# Patient Record
Sex: MALE | Race: BLACK OR AFRICAN AMERICAN | NOT HISPANIC OR LATINO | URBAN - METROPOLITAN AREA
[De-identification: names, ages, dates, MRNs, and addresses within clinical notes are randomized per-mention and may not be internally consistent; named-entity substitution may affect disease eponyms.]

---

## 2018-12-11 ENCOUNTER — EMERGENCY (EMERGENCY)
Facility: HOSPITAL | Age: 43
LOS: 0 days | Discharge: HOME | End: 2018-12-11
Attending: EMERGENCY MEDICINE
Payer: SELF-PAY

## 2018-12-11 VITALS
TEMPERATURE: 98 F | DIASTOLIC BLOOD PRESSURE: 98 MMHG | RESPIRATION RATE: 18 BRPM | WEIGHT: 225.09 LBS | SYSTOLIC BLOOD PRESSURE: 162 MMHG | HEIGHT: 70 IN | OXYGEN SATURATION: 95 % | HEART RATE: 98 BPM

## 2018-12-11 PROCEDURE — 99283 EMERGENCY DEPT VISIT LOW MDM: CPT

## 2018-12-11 RX ORDER — OXYCODONE AND ACETAMINOPHEN 5; 325 MG/1; MG/1
1 TABLET ORAL ONCE
Qty: 0 | Refills: 0 | Status: DISCONTINUED | OUTPATIENT
Start: 2018-12-11 | End: 2018-12-11

## 2018-12-11 RX ADMIN — Medication 1 TABLET(S): at 20:54

## 2018-12-11 RX ADMIN — OXYCODONE AND ACETAMINOPHEN 1 TABLET(S): 5; 325 TABLET ORAL at 20:53

## 2018-12-11 NOTE — ED PROVIDER NOTE - OBJECTIVE STATEMENT
44 y/o male with no PMH here c/o R upper dental pain/fracture s/p hit in mouth with basketball last night. pt states two pieces of the tooth came out which he has at home in a tissue. pt states he went to two dental clinics today who couldn't see him and was told to go to the [facility] dental clinic, but came here. pt states he had dental work done few years ago and had tooth extractions next to the one that broke. pt states he took motrin/aleve with mild relief. pt denies LOC, pt otherwise has no other complaints. denies dizziness, change in vision    ROS: No fever/chills. No eye pain/changes in vision, No ear pain/sore throat/dysphagia, No chest pain/palpitations. No SOB/cough/. No abdominal pain, N/V/D, no black/bloody bm. No dysuria/frequency/discharge, No headache. No Dizziness.    No rashes or breaks in skin. No numbness/tingling/weakness.

## 2018-12-11 NOTE — ED PROVIDER NOTE - ATTENDING CONTRIBUTION TO CARE
Dimitri: I performed a face to face bedside interview with patient regarding history of present illness, review of symptoms and past medical history. I completed an independent physical exam.  I have discussed patient's plan of care with advanced care provider.   I agree with note as stated above including HISTORY OF PRESENT ILLNESS, HIV, PAST MEDICAL/SURGICAL/FAMILY/SOCIAL HISTORY, ALLERGIES AND HOME MEDICATIONS, REVIEW OF SYSTEMS, PHYSICAL EXAM, MEDICAL DECISION MAKING and any PROGRESS NOTES during the time I functioned as the attending physician for this patient  unless otherwise noted. My brief assessment is as follows: injury to right 3rd maxillary molar (#3) last night playing basketball after being hit in mouth with bball. Two separate pieces of tooth fell out. Went to multiple dental clinics today but unable to be seen, meant to go to [facility] for dental clinic but came to . No airway issues, no sign abscess, no fever, no neuro symptoms or significant facial trauma. socket of 3rd tooth without any visible tooth and down to gum region without visible root/pulp. 4th tooth missing from distsant past per pt with metal bar in place. augmentin and pain meds (with instructions), will f/u at [facility] Dental clinic within 24 hours. Pt has flight tmrw that he states has to make and wants to see dental before flight.

## 2018-12-11 NOTE — ED PROVIDER NOTE - MEDICAL DECISION MAKING DETAILS
pt here with tooth fracture/dental pain, s/p accidentally got hit with basketball, was told to go to [facility] dental clinic, by mistake came here instead, will give pain control, abx (pt not driving home) and provided dental clinic info, (pt has pieces of tooth at home, hasn't been in any solution) educated pt re f/u needs, ok with dc

## 2018-12-11 NOTE — ED ADULT TRIAGE NOTE - CHIEF COMPLAINT QUOTE
c/o pain r upper teeth states 2 teeth broken s/p hit in face with basketball last night unable to see dentist today denies loc

## 2018-12-11 NOTE — ED PROVIDER NOTE - PHYSICAL EXAMINATION
Gen: Alert, NAD, not toxic  Head: NC, AT, PERRL, EOMI, normal lids/conjunctiva, no orbital/maxiallry tenderness or ecchymosis  ENT: B TM WNL, normal hearing, patent oropharynx without erythema/exudate, uvula midline, poor dentition, 4th molar out, +fracture 3rd, no bleeding  Neck: +supple, no tenderness/meningismus/JVD, +Trachea midline  Pulm: Bilateral BS, normal resp effort, no wheeze/stridor/retractions  CV: RRR, no M/R/G, +dist pulses  Abd: soft, NT/ND, +BS, no hepatosplenomegaly  Mskel: no edema/erythema/cyanosis  Skin: no rash  Neuro: AAOx3, no sensory/motor deficits, CN 2-12 intact

## 2018-12-11 NOTE — ED ADULT NURSE NOTE - OBJECTIVE STATEMENT
Patient was playing basketball and got hit in the face with the ball. patient denies syncope and had his tooth on the right upper side fall out. Pt states it was a fake tooth. Patient was playing basketball and got hit in the face with the ball. patient denies syncope and had his tooth 3rd molar on the right upper side broke. Pt states it was a fake tooth.

## 2018-12-11 NOTE — ED ADULT NURSE NOTE - NSIMPLEMENTINTERV_GEN_ALL_ED
Implemented All Universal Safety Interventions:  [place] to call system. Call bell, personal items and telephone within reach. Instruct patient to call for assistance. Room bathroom lighting operational. Non-slip footwear when patient is off stretcher. Physically safe environment: no spills, clutter or unnecessary equipment. Stretcher in lowest position, wheels locked, appropriate side rails in place.

## 2018-12-12 DIAGNOSIS — K08.89 OTHER SPECIFIED DISORDERS OF TEETH AND SUPPORTING STRUCTURES: ICD-10-CM
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
## 2018-12-12 DIAGNOSIS — S02.5XXA FRACTURE OF TOOTH (TRAUMATIC), INITIAL ENCOUNTER FOR CLOSED FRACTURE: ICD-10-CM
